# Patient Record
Sex: MALE | Race: WHITE | Employment: UNEMPLOYED | URBAN - METROPOLITAN AREA
[De-identification: names, ages, dates, MRNs, and addresses within clinical notes are randomized per-mention and may not be internally consistent; named-entity substitution may affect disease eponyms.]

---

## 2020-07-02 ENCOUNTER — OFFICE VISIT (OUTPATIENT)
Dept: FAMILY MEDICINE | Facility: CLINIC | Age: 50
End: 2020-07-02

## 2020-07-02 VITALS
HEART RATE: 54 BPM | OXYGEN SATURATION: 96 % | HEIGHT: 69 IN | WEIGHT: 236.2 LBS | TEMPERATURE: 98 F | BODY MASS INDEX: 34.98 KG/M2 | DIASTOLIC BLOOD PRESSURE: 88 MMHG | SYSTOLIC BLOOD PRESSURE: 130 MMHG

## 2020-07-02 DIAGNOSIS — M25.512 CHRONIC LEFT SHOULDER PAIN: ICD-10-CM

## 2020-07-02 DIAGNOSIS — Z76.89 HEALTH CARE HOME: ICD-10-CM

## 2020-07-02 DIAGNOSIS — G89.29 CHRONIC LEFT SHOULDER PAIN: ICD-10-CM

## 2020-07-02 DIAGNOSIS — F41.1 GENERALIZED ANXIETY DISORDER: ICD-10-CM

## 2020-07-02 DIAGNOSIS — Z01.818 PRE-OPERATIVE EXAMINATION: Primary | ICD-10-CM

## 2020-07-02 PROBLEM — Z13.6 SCREENING FOR CARDIOVASCULAR CONDITION: Status: ACTIVE | Noted: 2020-07-02

## 2020-07-02 PROBLEM — Z71.89 ACP (ADVANCE CARE PLANNING): Status: ACTIVE | Noted: 2020-07-02

## 2020-07-02 PROBLEM — Z98.1 HISTORY OF LUMBAR FUSION: Status: ACTIVE | Noted: 2020-07-02

## 2020-07-02 PROBLEM — Z87.891 HISTORY OF SMOKELESS TOBACCO USE: Status: ACTIVE | Noted: 2020-07-02

## 2020-07-02 PROBLEM — M65.839 INTERSECTION SYNDROME: Status: ACTIVE | Noted: 2017-03-10

## 2020-07-02 LAB
ERYTHROCYTE [DISTWIDTH] IN BLOOD BY AUTOMATED COUNT: 12.7 %
HCT VFR BLD AUTO: 43.1 % (ref 40–53)
HEMOGLOBIN: 14.9 G/DL (ref 13.3–17.7)
MCH RBC QN AUTO: 31 PG (ref 26–33)
MCHC RBC AUTO-ENTMCNC: 34.6 G/DL (ref 31–36)
MCV RBC AUTO: 89.6 FL (ref 78–100)
PLATELET COUNT - QUEST: 135 10^9/L (ref 150–375)
RBC # BLD AUTO: 4.81 10*12/L (ref 4.4–5.9)
WBC # BLD AUTO: 5.6 10*9/L (ref 4–11)

## 2020-07-02 PROCEDURE — 36415 COLL VENOUS BLD VENIPUNCTURE: CPT | Performed by: PHYSICIAN ASSISTANT

## 2020-07-02 PROCEDURE — 85027 COMPLETE CBC AUTOMATED: CPT | Performed by: PHYSICIAN ASSISTANT

## 2020-07-02 PROCEDURE — 99214 OFFICE O/P EST MOD 30 MIN: CPT | Performed by: PHYSICIAN ASSISTANT

## 2020-07-02 RX ORDER — PAROXETINE 30 MG/1
TABLET, FILM COATED ORAL
COMMUNITY
Start: 2020-07-01 | End: 2020-10-30

## 2020-07-02 SDOH — HEALTH STABILITY: MENTAL HEALTH: HOW OFTEN DO YOU HAVE A DRINK CONTAINING ALCOHOL?: 2-3 TIMES A WEEK

## 2020-07-02 SDOH — HEALTH STABILITY: MENTAL HEALTH: HOW MANY STANDARD DRINKS CONTAINING ALCOHOL DO YOU HAVE ON A TYPICAL DAY?: 3 OR 4

## 2020-07-02 SDOH — HEALTH STABILITY: MENTAL HEALTH: HOW OFTEN DO YOU HAVE 6 OR MORE DRINKS ON ONE OCCASION?: NEVER

## 2020-07-02 ASSESSMENT — MIFFLIN-ST. JEOR: SCORE: 1903.84

## 2020-07-02 NOTE — PROGRESS NOTES
[facility] PHYSICIANS  [address] [place] [address]  [address]  [facility] [address]  [phone]  Dept: [phone]    PRE-OP EVALUATION:  Today's date: 2020    Kelby Hong (: 1970) presents for pre-operative evaluation assessment as requested by Dr. Mendiola.  He requires evaluation and anesthesia risk assessment prior to undergoing surgery/procedure for treatment of Left shoulder.    Proposed Surgery/ Procedure: Left shoulder scope  Date of Surgery/ Procedure: 2020  Time of Surgery/ Procedure: 1045 am  Hospital/Surgical Facility: [facility]  Fax number for surgical facility: [phone]  Primary Physician: Belen Martin  Type of Anesthesia Anticipated: to be determined    Patient has a Health Care Directive or Living Will:  NO    1. NO - Do you have a history of heart attack, stroke, stent, bypass or surgery on an artery in the head, neck, heart or legs?  2. NO - Do you ever have any pain or discomfort in your chest?  3. NO - Do you have a history of  Heart Failure?  4. NO - Are you troubled by shortness of breath when: walking on the level, up a slight hill or at night?  5. NO - Do you currently have a cold, bronchitis or other respiratory infection?  6. NO - Do you have a cough, shortness of breath or wheezing?  7. NO - Do you sometimes get pains in the calves of your legs when you walk?  8. NO - Do you or anyone in your family have previous history of blood clots?  9. NO - Do you or does anyone in your family have a serious bleeding problem such as prolonged bleeding following surgeries or cuts?  10. NO - Have you ever had problems with anemia or been told to take iron pills?  11. NO - Have you had any abnormal blood loss such as black, tarry or bloody stools, or abnormal vaginal bleeding?  12. NO - Have you ever had a blood transfusion?  13. NO - Have you or any of your relatives ever had problems with anesthesia?  14. NO - Do you have sleep apnea, excessive  snoring or daytime drowsiness?  15. NO - Do you have any prosthetic heart valves?  16. NO - Do you have prosthetic joints?  17. NO - Is there any chance that you may be pregnant?      HPI:     HPI related to upcoming procedure: Kelby sustained a injury to left shoulder approximately 1 year ago. Did have MRI last October, and tried therapy and corticosteroid injection, which wasn't adequately effective. Plan now is for arthroscopic surgery.     See problem list for active medical problems.  Problems all longstanding and stable, except as noted/documented.  See ROS for pertinent symptoms related to these conditions.      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 No pertinent past medical history.  Past Surgical History:   Procedure Laterality Date     AS LAT LUMBAR SPINE FUSION  2005    L4-S1     ROTATOR CUFF REPAIR RT/LT Right     2000     VASECTOMY      age 23     Current Outpatient Medications   Medication Sig Dispense Refill     PARoxetine (PAXIL) 30 MG tablet        OTC products: None, except as noted above    Allergies   Allergen Reactions     Diphtheria Toxoid-Tetanus Tox-Thimerosal      unknown     Tetanus Toxoid      PN: LW Reaction: Unknown Reaction      Latex Allergy: NO    Social History     Tobacco Use     Smoking status: Never Smoker     Smokeless tobacco: Former User   Substance Use Topics     Alcohol use: Yes     Frequency: 2-3 times a week     Drinks per session: 3 or 4     " Binge frequency: Never     History   Drug Use Unknown       REVIEW OF SYSTEMS:   Constitutional, neuro, ENT, endocrine, pulmonary, cardiac, gastrointestinal, genitourinary, musculoskeletal, integument and psychiatric systems are negative, except as otherwise noted.    EXAM:   /88 (BP Location: Right arm, Patient Position: Sitting, Cuff Size: Adult Large)   Pulse 54   Temp 98  F (36.7  C) (Oral)   Ht 1.74 m (5' 8.5\")   Wt 107.1 kg (236 lb 3.2 oz)   SpO2 96%   BMI 35.39 kg/m      GENERAL APPEARANCE: healthy, alert and no distress     EYES: EOMI,  PERRL     HENT: ear canals and TM's normal and nose and mouth without ulcers or lesions     NECK: no adenopathy, no asymmetry, masses, or scars and thyroid normal to palpation     RESP: lungs clear to auscultation - no rales, rhonchi or wheezes     CV: regular rates and rhythm, normal S1 S2, no S3 or S4 and no murmur, click or rub     ABDOMEN:  soft, nontender, no HSM or masses and bowel sounds normal     MS: extremities normal- no gross deformities noted, no evidence of inflammation in joints, FROM in all extremities.     SKIN: no suspicious lesions or rashes     NEURO: Normal strength and tone, sensory exam grossly normal, mentation intact and speech normal     PSYCH: mentation appears normal. and affect normal/bright     LYMPHATICS: No cervical adenopathy    DIAGNOSTICS:   EKG: Not indicated due to non-vascular surgery and low risk of event (age <65 and without cardiac risk factors)  Hemoglobin (indicated for history of anemia or procedure with significant blood loss such as tonsillectomy, major intraperitoneal surgery, vascular surgery, major spine surgery, total joint replacement)    Office Visit on 07/02/2020   Component Date Value Ref Range Status     WBC 07/02/2020 5.6  4.0 - 11 10*9/L Final     RBC Count 07/02/2020 4.81  4.4 - 5.9 10*12/L Final     Hemoglobin 07/02/2020 14.9  13.3 - 17.7 g/dL Final     Hematocrit 07/02/2020 43.1  40.0 - 53.0 % Final     " MCV 07/02/2020 89.6  78 - 100 fL Final     MCH 07/02/2020 31.0  26 - 33 pg Final     MCHC 07/02/2020 34.6  31 - 36 g/dL Final     RDW 07/02/2020 12.7  % Final     Platelet Count 07/02/2020 135* 150 - 375 10^9/L Final    fingerpoke, ran twice     Platelets mildly low. No immediate concerns.    IMPRESSION:   Reason for surgery/procedure: Left shoulder pain  Diagnosis/reason for consult: Pre-operative Examination    The proposed surgical procedure is considered INTERMEDIATE risk.    REVISED CARDIAC RISK INDEX  The patient has the following serious cardiovascular risks for perioperative complications such as (MI, PE, VFib and 3  AV Block):  No serious cardiac risks  INTERPRETATION: 1 risks: Class II (low risk - 0.9% complication rate)    The patient has the following additional risks for perioperative complications:  No identified additional risks      ICD-10-CM    1. Pre-operative examination  Z01.818 HEMOGRAM/PLATELET (BFP)     VENOUS COLLECTION   2. Chronic left shoulder pain  M25.512 HEMOGRAM/PLATELET (BFP)    G89.29 VENOUS COLLECTION   3. Generalized anxiety disorder  F41.1    4. Health Care Home  Z76.89        RECOMMENDATIONS:     --Patient is to take all scheduled medications on the day of surgery EXCEPT for modifications listed below.    APPROVAL GIVEN to proceed with proposed procedure, without further diagnostic evaluation    1. Seven days before surgery do not take Aspirin or any over-the-counter pain medications other than Tylenol.  TYLENOL is the safest pain pill to use before surgery because it does not affect your bleeding time. If tylenol is not sufficient for pain control talk to me or the surgeon and we will decide what is safe to use.    2. Do not eat anything after midnight  (sudarshan of the surgery) and nothing the morning of the surgery.    3. Medications: Hold medication on day of surgery.      4. Follow all instructions given by the surgery team. They usually give out a packet. Read it and please  follow it precisely. This helps surgical experience and outcomes.    5. If you have any questions do not hesitate to call me or the surgeon/surgical team.      Belen Martin PA-C  [facility]           Signed Electronically by: Belen Martin PA-C    Copy of this evaluation report is provided to requesting physician.    [place] Preop Guidelines    Revised Cardiac Risk Index

## 2020-07-02 NOTE — LETTER
[facility] PHYSICIANS  [address] [place] [address]  [address]  [facility] [address]  [phone]  Dept: [phone]    PRE-OP EVALUATION:  Today's date: 2020    Kelby Hong (: 1970) presents for pre-operative evaluation assessment as requested by Dr. Mendiola.  He requires evaluation and anesthesia risk assessment prior to undergoing surgery/procedure for treatment of Left shoulder.    Proposed Surgery/ Procedure: Left shoulder scope  Date of Surgery/ Procedure: 2020  Time of Surgery/ Procedure: 1045 am  Hospital/Surgical Facility: [facility]  Fax number for surgical facility: [phone]  Primary Physician: Belen Martin  Type of Anesthesia Anticipated: to be determined    Patient has a Health Care Directive or Living Will:  NO    1. NO - Do you have a history of heart attack, stroke, stent, bypass or surgery on an artery in the head, neck, heart or legs?  2. NO - Do you ever have any pain or discomfort in your chest?  3. NO - Do you have a history of  Heart Failure?  4. NO - Are you troubled by shortness of breath when: walking on the level, up a slight hill or at night?  5. NO - Do you currently have a cold, bronchitis or other respiratory infection?  6. NO - Do you have a cough, shortness of breath or wheezing?  7. NO - Do you sometimes get pains in the calves of your legs when you walk?  8. NO - Do you or anyone in your family have previous history of blood clots?  9. NO - Do you or does anyone in your family have a serious bleeding problem such as prolonged bleeding following surgeries or cuts?  10. NO - Have you ever had problems with anemia or been told to take iron pills?  11. NO - Have you had any abnormal blood loss such as black, tarry or bloody stools, or abnormal vaginal bleeding?  12. NO - Have you ever had a blood transfusion?  13. NO - Have you or any of your relatives ever had problems with anesthesia?  14. NO - Do you have sleep apnea, excessive  snoring or daytime drowsiness?  15. NO - Do you have any prosthetic heart valves?  16. NO - Do you have prosthetic joints?  17. NO - Is there any chance that you may be pregnant?      HPI:     HPI related to upcoming procedure: Kelby sustained a injury to left shoulder approximately 1 year ago. Did have MRI last October, and tried therapy and corticosteroid injection, which wasn't adequately effective. Plan now is for arthroscopic surgery.     See problem list for active medical problems.  Problems all longstanding and stable, except as noted/documented.  See ROS for pertinent symptoms related to these conditions.      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 No pertinent past medical history.  Past Surgical History:   Procedure Laterality Date     AS LAT LUMBAR SPINE FUSION  2005    L4-S1     ROTATOR CUFF REPAIR RT/LT Right     2000     VASECTOMY      age 23     Current Outpatient Medications   Medication Sig Dispense Refill     PARoxetine (PAXIL) 30 MG tablet        OTC products: None, except as noted above    Allergies   Allergen Reactions     Diphtheria Toxoid-Tetanus Tox-Thimerosal      unknown     Tetanus Toxoid      PN: LW Reaction: Unknown Reaction      Latex Allergy: NO    Social History     Tobacco Use     Smoking status: Never Smoker     Smokeless tobacco: Former User   Substance Use Topics     Alcohol use: Yes     Frequency: 2-3 times a week     Drinks per session: 3 or 4     " Binge frequency: Never     History   Drug Use Unknown       REVIEW OF SYSTEMS:   Constitutional, neuro, ENT, endocrine, pulmonary, cardiac, gastrointestinal, genitourinary, musculoskeletal, integument and psychiatric systems are negative, except as otherwise noted.    EXAM:   /88 (BP Location: Right arm, Patient Position: Sitting, Cuff Size: Adult Large)   Pulse 54   Temp 98  F (36.7  C) (Oral)   Ht 1.74 m (5' 8.5\")   Wt 107.1 kg (236 lb 3.2 oz)   SpO2 96%   BMI 35.39 kg/m      GENERAL APPEARANCE: healthy, alert and no distress     EYES: EOMI,  PERRL     HENT: ear canals and TM's normal and nose and mouth without ulcers or lesions     NECK: no adenopathy, no asymmetry, masses, or scars and thyroid normal to palpation     RESP: lungs clear to auscultation - no rales, rhonchi or wheezes     CV: regular rates and rhythm, normal S1 S2, no S3 or S4 and no murmur, click or rub     ABDOMEN:  soft, nontender, no HSM or masses and bowel sounds normal     MS: extremities normal- no gross deformities noted, no evidence of inflammation in joints, FROM in all extremities.     SKIN: no suspicious lesions or rashes     NEURO: Normal strength and tone, sensory exam grossly normal, mentation intact and speech normal     PSYCH: mentation appears normal. and affect normal/bright     LYMPHATICS: No cervical adenopathy    DIAGNOSTICS:   EKG: Not indicated due to non-vascular surgery and low risk of event (age <65 and without cardiac risk factors)  Hemoglobin (indicated for history of anemia or procedure with significant blood loss such as tonsillectomy, major intraperitoneal surgery, vascular surgery, major spine surgery, total joint replacement)    Office Visit on 07/02/2020   Component Date Value Ref Range Status     WBC 07/02/2020 5.6  4.0 - 11 10*9/L Final     RBC Count 07/02/2020 4.81  4.4 - 5.9 10*12/L Final     Hemoglobin 07/02/2020 14.9  13.3 - 17.7 g/dL Final     Hematocrit 07/02/2020 43.1  40.0 - 53.0 % Final     " MCV 07/02/2020 89.6  78 - 100 fL Final     MCH 07/02/2020 31.0  26 - 33 pg Final     MCHC 07/02/2020 34.6  31 - 36 g/dL Final     RDW 07/02/2020 12.7  % Final     Platelet Count 07/02/2020 135* 150 - 375 10^9/L Final    fingerpoke, ran twice     Platelets mildly low. No immediate concerns.    IMPRESSION:   Reason for surgery/procedure: Left shoulder pain  Diagnosis/reason for consult: Pre-operative Examination    The proposed surgical procedure is considered INTERMEDIATE risk.    REVISED CARDIAC RISK INDEX  The patient has the following serious cardiovascular risks for perioperative complications such as (MI, PE, VFib and 3  AV Block):  No serious cardiac risks  INTERPRETATION: 1 risks: Class II (low risk - 0.9% complication rate)    The patient has the following additional risks for perioperative complications:  No identified additional risks      ICD-10-CM    1. Pre-operative examination  Z01.818 HEMOGRAM/PLATELET (BFP)     VENOUS COLLECTION   2. Chronic left shoulder pain  M25.512 HEMOGRAM/PLATELET (BFP)    G89.29 VENOUS COLLECTION   3. Generalized anxiety disorder  F41.1    4. Health Care Home  Z76.89        RECOMMENDATIONS:     --Patient is to take all scheduled medications on the day of surgery EXCEPT for modifications listed below.    APPROVAL GIVEN to proceed with proposed procedure, without further diagnostic evaluation    1. Seven days before surgery do not take Aspirin or any over-the-counter pain medications other than Tylenol.  TYLENOL is the safest pain pill to use before surgery because it does not affect your bleeding time. If tylenol is not sufficient for pain control talk to me or the surgeon and we will decide what is safe to use.    2. Do not eat anything after midnight  (sudarshan of the surgery) and nothing the morning of the surgery.    3. Medications: Hold medication on day of surgery.      4. Follow all instructions given by the surgery team. They usually give out a packet. Read it and please  follow it precisely. This helps surgical experience and outcomes.    5. If you have any questions do not hesitate to call me or the surgeon/surgical team.      Belen Martin PA-C  [facility]           Signed Electronically by: Belen Martin PA-C    Copy of this evaluation report is provided to requesting physician.    [facility] Preop Guidelines    Revised Cardiac Risk Index

## 2020-09-30 RX ORDER — PAROXETINE 30 MG/1
30 TABLET, FILM COATED ORAL EVERY MORNING
COMMUNITY
Start: 2020-09-30

## 2020-09-30 NOTE — TELEPHONE ENCOUNTER
Refused Prescriptions:                       Disp   Refills    PARoxetine (PAXIL) 30 MG tablet                            Sig: Take 1 tablet (30 mg) by mouth every morning  Refused By: MARA FISHER  Reason for Refusal: Patient needs appointment    This patient was in to see [facility] in July for a pre-op.  He has not seen Dr Hernandez for a medication check.  Needs to be seen to discuss meds.

## 2020-10-30 ENCOUNTER — OFFICE VISIT (OUTPATIENT)
Dept: FAMILY MEDICINE | Facility: CLINIC | Age: 50
End: 2020-10-30

## 2020-10-30 VITALS
HEIGHT: 68 IN | OXYGEN SATURATION: 98 % | BODY MASS INDEX: 36.28 KG/M2 | WEIGHT: 239.4 LBS | TEMPERATURE: 98 F | HEART RATE: 58 BPM | DIASTOLIC BLOOD PRESSURE: 80 MMHG | SYSTOLIC BLOOD PRESSURE: 120 MMHG

## 2020-10-30 DIAGNOSIS — Z00.00 ROUTINE HISTORY AND PHYSICAL EXAMINATION OF ADULT: Primary | ICD-10-CM

## 2020-10-30 DIAGNOSIS — F34.1 DYSTHYMIC DISORDER: ICD-10-CM

## 2020-10-30 LAB
ALBUMIN SERPL-MCNC: 4.5 G/DL (ref 3.6–5.1)
ALBUMIN/GLOB SERPL: 1.7 {RATIO} (ref 1–2.5)
ALP SERPL-CCNC: 74 U/L (ref 33–130)
ALT 1742-6: 36 U/L (ref 0–32)
AST 1920-8: 25 U/L (ref 0–35)
BILIRUB SERPL-MCNC: 0.6 MG/DL (ref 0.2–1.2)
BUN SERPL-MCNC: 25 MG/DL (ref 7–25)
BUN/CREATININE RATIO: 23.8 (ref 6–22)
CALCIUM SERPL-MCNC: 9.9 MG/DL (ref 8.6–10.3)
CHLORIDE SERPLBLD-SCNC: 107.1 MMOL/L (ref 98–110)
CHOLEST SERPL-MCNC: 225 MG/DL (ref 0–199)
CHOLEST/HDLC SERPL: 5 {RATIO} (ref 0–5)
CO2 SERPL-SCNC: 28.9 MMOL/L (ref 20–32)
CREAT SERPL-MCNC: 1.05 MG/DL (ref 0.7–1.18)
GLOBULIN, CALCULATED - QUEST: 2.6 (ref 1.9–3.7)
GLUCOSE SERPL-MCNC: 103 MG/DL (ref 60–99)
HDLC SERPL-MCNC: 50 MG/DL (ref 40–150)
HEMOGLOBIN: 16.1 G/DL (ref 13.3–17.7)
LDLC SERPL CALC-MCNC: 149 MG/DL (ref 0–130)
POTASSIUM SERPL-SCNC: 5.17 MMOL/L (ref 3.5–5.3)
PROT SERPL-MCNC: 7.1 G/DL (ref 6.1–8.1)
SODIUM SERPL-SCNC: 143.3 MMOL/L (ref 135–146)
TRIGL SERPL-MCNC: 130 MG/DL (ref 0–149)

## 2020-10-30 PROCEDURE — 36415 COLL VENOUS BLD VENIPUNCTURE: CPT | Performed by: FAMILY MEDICINE

## 2020-10-30 PROCEDURE — 99396 PREV VISIT EST AGE 40-64: CPT | Performed by: FAMILY MEDICINE

## 2020-10-30 PROCEDURE — 80053 COMPREHEN METABOLIC PANEL: CPT | Performed by: FAMILY MEDICINE

## 2020-10-30 PROCEDURE — 85018 HEMOGLOBIN: CPT | Performed by: FAMILY MEDICINE

## 2020-10-30 PROCEDURE — 84153 ASSAY OF PSA TOTAL: CPT | Mod: 90 | Performed by: FAMILY MEDICINE

## 2020-10-30 PROCEDURE — 80061 LIPID PANEL: CPT | Performed by: FAMILY MEDICINE

## 2020-10-30 RX ORDER — PAROXETINE 30 MG/1
30 TABLET, FILM COATED ORAL EVERY MORNING
Qty: 90 TABLET | Refills: 3 | Status: SHIPPED | OUTPATIENT
Start: 2020-10-30

## 2020-10-30 ASSESSMENT — ANXIETY QUESTIONNAIRES
2. NOT BEING ABLE TO STOP OR CONTROL WORRYING: NOT AT ALL
7. FEELING AFRAID AS IF SOMETHING AWFUL MIGHT HAPPEN: SEVERAL DAYS
6. BECOMING EASILY ANNOYED OR IRRITABLE: SEVERAL DAYS
1. FEELING NERVOUS, ANXIOUS, OR ON EDGE: NOT AT ALL
5. BEING SO RESTLESS THAT IT IS HARD TO SIT STILL: NOT AT ALL
GAD7 TOTAL SCORE: 2
IF YOU CHECKED OFF ANY PROBLEMS ON THIS QUESTIONNAIRE, HOW DIFFICULT HAVE THESE PROBLEMS MADE IT FOR YOU TO DO YOUR WORK, TAKE CARE OF THINGS AT HOME, OR GET ALONG WITH OTHER PEOPLE: NOT DIFFICULT AT ALL
3. WORRYING TOO MUCH ABOUT DIFFERENT THINGS: NOT AT ALL

## 2020-10-30 ASSESSMENT — PATIENT HEALTH QUESTIONNAIRE - PHQ9
5. POOR APPETITE OR OVEREATING: NOT AT ALL
SUM OF ALL RESPONSES TO PHQ QUESTIONS 1-9: 4

## 2020-10-30 ASSESSMENT — MIFFLIN-ST. JEOR: SCORE: 1910.41

## 2020-10-30 NOTE — PROGRESS NOTES
SUBJECTIVE:    CC: Kelby Hong is a 50 year old male who presents for physical    HPI: over all healthy    HISTORIES:    PROBLEM LIST:                   Patient Active Problem List   Diagnosis     Dysthymic disorder     History of lumbar fusion     History of smokeless tobacco use     Intersection syndrome     ACP (advance care planning)     Health Care Home       PAST MEDICAL HISTORY:                History reviewed. No pertinent past medical history.    PAST SURGICAL HISTORY:                  Past Surgical History:   Procedure Laterality Date     AS LAT LUMBAR SPINE FUSION  2005    L4-S1     ROTATOR CUFF REPAIR RT/LT Right     2000     VASECTOMY      age 23       CURRENT MEDICATIONS:                  Current Outpatient Medications   Medication Sig Dispense Refill     PARoxetine (PAXIL) 30 MG tablet Take 1 tablet (30 mg) by mouth every morning 90 tablet 3       ALLERGIES:                  Allergies   Allergen Reactions     Diphtheria Toxoid-Tetanus Tox-Thimerosal      unknown     Tetanus Toxoid      PN: LW Reaction: Unknown Reaction       SOCIAL HISTORY:                  Social History     Socioeconomic History     Marital status:      Spouse name: Not on file     Number of children: Not on file     Years of education: Not on file     Highest education level: Not on file   Occupational History     Not on file   Social Needs     Financial resource strain: Not on file     Food insecurity     Worry: Not on file     Inability: Not on file     Transportation needs     Medical: Not on file     Non-medical: Not on file   Tobacco Use     Smoking status: Never Smoker     Smokeless tobacco: Former User   Substance and Sexual Activity     Alcohol use: Yes     Frequency: 2-3 times a week     Drinks per session: 3 or 4     Binge frequency: Never     Drug use: Never     Sexual activity: Yes     Partners: Female     Birth control/protection: Male Surgical   Lifestyle     Physical activity     Days per week: Not on file  "    Minutes per session: Not on file     Stress: Not on file   Relationships     Social connections     Talks on phone: Not on file     Gets together: Not on file     Attends Jain service: Not on file     Active member of club or organization: Not on file     Attends meetings of clubs or organizations: Not on file     Relationship status: Not on file     Intimate partner violence     Fear of current or ex partner: Not on file     Emotionally abused: Not on file     Physically abused: Not on file     Forced sexual activity: Not on file   Other Topics Concern     Not on file   Social History Narrative     Not on file       FAMILY HISTORY:                   Family History   Problem Relation Age of Onset     Lung Cancer Mother      Lung Cancer Father      Hypertension Father      Colon Cancer No family hx of      Diabetes No family hx of      Prostate Cancer No family hx of      Hyperlipidemia No family hx of        HEALTH MAINTENANCE:                    REVIEW OF OUTSIDE RECORDS: YES - Date: 2019    REVIEW OF SYSTEMS:  CONSTITUTIONAL: NEGATIVE for fever, chills  INTEGUMENTARY/SKIN: NEGATIVE for worrisome rashes, moles or lesions  EYES: NEGATIVE for vision changes   ENT/MOUTH: NEGATIVE for ear, mouth and throat problems  RESP: NEGATIVE for significant cough or SOB  CV: NEGATIVE for chest pain, palpitations   GI: NEGATIVE for nausea, abdominal pain, heartburn, or change in bowel habits  : NEGATIVE for frequency, dysuria, or hematuria  MUSCULOSKELETAL: NEGATIVE for significant arthralgias or myalgia  NEURO: NEGATIVE for weakness, dizziness or paresthesias or headache  ENDOCRINE: NEGATIVE for temperature intolerance, skin/hair changes  HEME: NEGATIVE for bleeding problems  PSYCHIATRIC: NEGATIVE for changes in mood or affect    EXAM:    /80 (BP Location: Right arm, Patient Position: Sitting, Cuff Size: Adult Large)   Pulse 58   Temp 98  F (36.7  C) (Oral)   Ht 1.727 m (5' 8\")   Wt 108.6 kg (239 lb 6.4 oz)  "  SpO2 98%   BMI 36.40 kg/m    GENERAL APPEARANCE: healthy, alert and no distress   EXAM:  GENERAL APPEARANCE: healthy, alert and no distress  EYES: EOMI,  PERRL  HENT: ear canals and TM's normal and nose and mouth without ulcers or lesions  RESP: lungs clear to auscultation - no rales, rhonchi or wheezes  CV: regular rates and rhythm, normal S1 S2, no S3 or S4 and no murmur, click or rub -  ABDOMEN:  soft, nontender, no HSM or masses and bowel sounds normal  Rectal exam: prostate symmetric w/o nodularity, no masses palpated  GU_male: testicles normal without atrophy or masses and no hernias  MS: extremities normal- no gross deformities noted, no evidence of inflammation in joints, FROM in all extremities.  SKIN: no suspicious lesions or rashes  NEURO: Normal strength and tone, sensory exam grossly normal, mentation intact and speech normal  PSYCH: mentation appears normal and affect normal/bright  LYMPHATICS: No axillary, cervical, inguinal, or supraclavicular nodes         ASSESSMENT/PLAN  (Z00.00) Routine history and physical examination of adult  (primary encounter diagnosis)  Comment: healthy  Plan: HEMOGLOBIN, Comprehensive Metobolic Panel         (BFP), PSA Total ([facility]), Lipid Panel (BFP),         VENOUS COLLECTION     UTD on colon       (F34.1) Dysthymic disorder  Comment: doing well  Plan: PARoxetine (PAXIL) 30 MG tablet                I have discussed with patient the risks, benefits, medications, treatment options and modalities.   I have instructed the patient to call or schedule a follow-up appointment if any problems or failure to improve.

## 2020-10-30 NOTE — NURSING NOTE
Kelby is here for a fasting CPX.    Pre-Visit Screening:  Immunizations:UTD  Colonoscopy:2018  Mammogram:NA  Asthma Action Test/Plan:NA  PHQ9:today  GAD7:today  Questioned patient about current smoking habits Pt.never smoked  OK to leave a detailed message on voice mail for today's visit yes, phone # [phone]

## 2020-10-31 LAB — ABBOTT PSA - QUEST: 0.9 NG/ML

## 2020-10-31 ASSESSMENT — ANXIETY QUESTIONNAIRES: GAD7 TOTAL SCORE: 2

## 2020-11-02 ENCOUNTER — TELEPHONE (OUTPATIENT)
Dept: FAMILY MEDICINE | Facility: CLINIC | Age: 50
End: 2020-11-02

## 2020-11-02 NOTE — TELEPHONE ENCOUNTER
----- Message from Zach Hernandez MD sent at 11/2/2020  7:53 AM CST -----  Please call over all blood work fine except for mild elevated glucose and moderated elevated total chol but ratios doing pretty well  Recommend work on exercise and watch diet Recheck 6 months Dr CLEVELAND

## 2022-04-24 ENCOUNTER — OFFICE VISIT (OUTPATIENT)
Dept: URGENT CARE | Facility: URGENT CARE | Age: 52
End: 2022-04-24
Payer: COMMERCIAL

## 2022-04-24 VITALS
SYSTOLIC BLOOD PRESSURE: 126 MMHG | BODY MASS INDEX: 35.73 KG/M2 | DIASTOLIC BLOOD PRESSURE: 82 MMHG | OXYGEN SATURATION: 97 % | TEMPERATURE: 98.7 F | WEIGHT: 235 LBS | HEART RATE: 77 BPM | RESPIRATION RATE: 16 BRPM

## 2022-04-24 DIAGNOSIS — J20.6 ACUTE BRONCHITIS DUE TO RHINOVIRUS: Primary | ICD-10-CM

## 2022-04-24 PROCEDURE — 99213 OFFICE O/P EST LOW 20 MIN: CPT | Performed by: PHYSICIAN ASSISTANT

## 2022-04-24 RX ORDER — CODEINE PHOSPHATE AND GUAIFENESIN 10; 100 MG/5ML; MG/5ML
1-2 SOLUTION ORAL EVERY 4 HOURS PRN
Qty: 118 ML | Refills: 0 | Status: SHIPPED | OUTPATIENT
Start: 2022-04-24

## 2022-04-24 RX ORDER — ALBUTEROL SULFATE 90 UG/1
2 AEROSOL, METERED RESPIRATORY (INHALATION) EVERY 6 HOURS
Qty: 18 G | Refills: 0 | Status: SHIPPED | OUTPATIENT
Start: 2022-04-24

## 2022-04-24 ASSESSMENT — ENCOUNTER SYMPTOMS
ABDOMINAL PAIN: 0
COUGH: 1
FEVER: 0
WHEEZING: 1
VOMITING: 0
DIARRHEA: 0
SHORTNESS OF BREATH: 1
RHINORRHEA: 0

## 2022-04-24 NOTE — PROGRESS NOTES
Assessment & Plan:        ICD-10-CM    1. Acute bronchitis due to Rhinovirus  J20.6 albuterol (PROAIR HFA/PROVENTIL HFA/VENTOLIN HFA) 108 (90 Base) MCG/ACT inhaler     guaiFENesin-codeine (ROBITUSSIN AC) 100-10 MG/5ML solution         Plan/Clinical Decision Making:    Lungs clear on exam.  No fever.   Use albuterol inhaler as needed for wheezing or chest tightness.   Discussed how to use.   Can use Robitussin AC hs prn for cough at night.    checked, no concerns.     If worsening symptoms Follow-up for further evaluation, CXR.           At the end of the encounter, I discussed results, diagnosis, medications. Discussed red flags for immediate return to clinic/ER, as well as indications for follow up if no improvement. Patient understood and agreed to plan. Patient was stable for discharge.        Ivelisse Cortes PA-C on 4/24/2022 at 9:26 AM          Subjective:     HPI:    Kelby is a 52 year old male who presents to clinic today for the following health issues:  Chief Complaint   Patient presents with     Breathing Problem     SOB,  HA, chest congestion x 5 days -- took some Musinex-- NEG covid home test on Thursday     HPI    Patient complains of SOB, HA, chest congestion x 5 days.   [place]  Has had two at home covid tests.   Coughing seems to be worse symptoms.   Has tried Mucinex.   No hx of asthma.   Wife was recently ill with cough 3 weeks ago.   Up coughing at night and harder to sleep.     History obtained from the patient.    Review of Systems   Constitutional: Negative for fever.   HENT: Negative for congestion and rhinorrhea.    Respiratory: Positive for cough, shortness of breath and wheezing.    Gastrointestinal: Negative for abdominal pain, diarrhea and vomiting.         Patient Active Problem List   Diagnosis     Dysthymic disorder     History of lumbar fusion     History of smokeless tobacco use     Intersection syndrome     ACP (advance care planning)     Health Care Home         History reviewed. No pertinent past medical history.    Social History     Tobacco Use     Smoking status: Never Smoker     Smokeless tobacco: Former User   Substance Use Topics     Alcohol use: Yes             Objective:     Vitals:    04/24/22 0922   BP: 126/82   BP Location: Right arm   Patient Position: Chair   Cuff Size: Adult Large   Pulse: 77   Resp: 16   Temp: 98.7  F (37.1  C)   TempSrc: Oral   SpO2: 97%   Weight: 106.6 kg (235 lb)         Physical Exam   EXAM:   Pleasant, alert, appropriate appearance. NAD. Coughing throughout exam.   Head Exam: Normocephalic, atraumatic.  Eye Exam:  non icteric/injection.    Ear Exam: TMs grey without bulging. Normal canals.  Normal pinna.  Nose Exam: Normal external nose.    OroPharynx Exam:  Moist mucous membranes. No erythema, pharynx without exudate or hypertrophy.  Neck/Thyroid Exam:  No LAD.  No nodules or enlargement.  Chest/Respiratory Exam: CTAB.  Cardiovascular Exam: RRR. No murmur or rubs.      Results:  No results found for any visits on 04/24/22.

## 2022-04-26 ENCOUNTER — OFFICE VISIT (OUTPATIENT)
Dept: URGENT CARE | Facility: URGENT CARE | Age: 52
End: 2022-04-26
Payer: COMMERCIAL

## 2022-04-26 VITALS
SYSTOLIC BLOOD PRESSURE: 126 MMHG | DIASTOLIC BLOOD PRESSURE: 62 MMHG | OXYGEN SATURATION: 96 % | TEMPERATURE: 98.6 F | WEIGHT: 235 LBS | HEART RATE: 75 BPM | BODY MASS INDEX: 35.73 KG/M2 | RESPIRATION RATE: 16 BRPM

## 2022-04-26 DIAGNOSIS — J18.9 PNEUMONIA OF LEFT LOWER LOBE DUE TO INFECTIOUS ORGANISM: ICD-10-CM

## 2022-04-26 DIAGNOSIS — R06.02 SOB (SHORTNESS OF BREATH): Primary | ICD-10-CM

## 2022-04-26 PROCEDURE — 99214 OFFICE O/P EST MOD 30 MIN: CPT | Mod: 25 | Performed by: PHYSICIAN ASSISTANT

## 2022-04-26 PROCEDURE — 94640 AIRWAY INHALATION TREATMENT: CPT | Performed by: PHYSICIAN ASSISTANT

## 2022-04-26 RX ORDER — BENZONATATE 100 MG/1
200 CAPSULE ORAL 3 TIMES DAILY PRN
Qty: 40 CAPSULE | Refills: 0 | Status: SHIPPED | OUTPATIENT
Start: 2022-04-26

## 2022-04-26 RX ORDER — PREDNISONE 20 MG/1
40 TABLET ORAL DAILY
Qty: 10 TABLET | Refills: 0 | Status: SHIPPED | OUTPATIENT
Start: 2022-04-26 | End: 2022-05-01

## 2022-04-26 RX ORDER — IPRATROPIUM BROMIDE AND ALBUTEROL SULFATE 2.5; .5 MG/3ML; MG/3ML
3 SOLUTION RESPIRATORY (INHALATION) ONCE
Status: COMPLETED | OUTPATIENT
Start: 2022-04-26 | End: 2022-04-26

## 2022-04-26 RX ORDER — AZITHROMYCIN 250 MG/1
TABLET, FILM COATED ORAL
Qty: 6 TABLET | Refills: 0 | Status: SHIPPED | OUTPATIENT
Start: 2022-04-26 | End: 2022-05-01

## 2022-04-26 RX ADMIN — IPRATROPIUM BROMIDE AND ALBUTEROL SULFATE 3 ML: 2.5; .5 SOLUTION RESPIRATORY (INHALATION) at 19:58

## 2022-04-27 ASSESSMENT — ENCOUNTER SYMPTOMS
COUGH: 1
WHEEZING: 1
FATIGUE: 0
SHORTNESS OF BREATH: 1
RHINORRHEA: 1

## 2022-04-27 NOTE — PROGRESS NOTES
Assessment & Plan:        ICD-10-CM    1. SOB (shortness of breath)  R06.02 XR Chest 2 Views     INHALATION/NEBULIZER TREATMENT, INITIAL     ipratropium - albuterol 0.5 mg/2.5 mg/3 mL (DUONEB) neb solution 3 mL     azithromycin (ZITHROMAX) 250 MG tablet     predniSONE (DELTASONE) 20 MG tablet     benzonatate (TESSALON) 100 MG capsule     amoxicillin-clavulanate (AUGMENTIN) 875-125 MG tablet   2. Pneumonia of left lower lobe due to infectious organism  J18.9          Plan/Clinical Decision Making:    CXR with suspicious of pneumonia. Will treat with course of Augmentin and Zithromax.   Duo neb done in clinic.  Mild help. No wheezing heard on exam, but having tightness and occasional SOB.  Will treat with prednisone course.   Get rest, fluids. Tessalon Perles for cough prn.     IMPRESSION: Subtle patchy airspace opacity in the left lower lobe suspicious for pneumonia. Right lung clear. No pleural effusion or pneumothorax. Normal heart size.          At the end of the encounter, I discussed results, diagnosis, medications. Discussed red flags for immediate return to clinic/ER, as well as indications for follow up if no improvement. Patient understood and agreed to plan. Patient was stable for discharge.        Ivelisse Cortes PA-C on 4/26/2022 at 7:42 PM          Subjective:     HPI:    Kelby is a 52 year old male who presents to clinic today for the following health issues:  Chief Complaint   Patient presents with     Sick     Cough, body aches, SOB  x 7 days , was seen in  on Sunday - he got an  inhaler and some cough syrup  - FATHER has penumonia-- HOME covid test were negative     HPI    Cough, body aches. SOB x 7 days.   Patient seen on Sunday and prescribed inhaler and cough medication.   Hard to use inhaler since coughing so much.   Went to work yesterday, but stayed home.   Negative covid tests.   Nonsmoker.     History obtained from the patient.    Review of Systems   Constitutional: Negative for fatigue.    HENT: Positive for rhinorrhea.    Respiratory: Positive for cough, shortness of breath and wheezing.          Patient Active Problem List   Diagnosis     Dysthymic disorder     History of lumbar fusion     History of smokeless tobacco use     Intersection syndrome     ACP (advance care planning)     Health Care Home        History reviewed. No pertinent past medical history.    Social History     Tobacco Use     Smoking status: Never Smoker     Smokeless tobacco: Former User   Substance Use Topics     Alcohol use: Yes             Objective:     Vitals:    04/26/22 1917   BP: 126/62   BP Location: Right arm   Patient Position: Chair   Cuff Size: Adult Regular   Pulse: 75   Resp: 16   Temp: 98.6  F (37  C)   TempSrc: Oral   SpO2: 96%   Weight: 106.6 kg (235 lb)         Physical Exam   EXAM:   Pleasant, alert, appropriate appearance. Mild distress, coughing for much of exam.   Head Exam: Normocephalic, atraumatic.  Eye Exam:  non icteric/injection.    Ear Exam: TMs grey without bulging. Normal canals.  Normal pinna.  Nose Exam: Normal external nose.    OroPharynx Exam:  Moist mucous membranes. No erythema, pharynx without exudate or hypertrophy.  Neck/Thyroid Exam:  No LAD.    Chest/Respiratory Exam: CTAB.  Cardiovascular Exam: RRR. No murmur or rubs.      Results:  Results for orders placed or performed in visit on 04/26/22   XR Chest 2 Views     Status: None    Narrative    EXAM: XR CHEST 2 VW  LOCATION: [facility]  DATE/TIME: 4/26/2022 8:00 PM    INDICATION:  SOB (shortness of breath)  COMPARISON: 01/31/2019      Impression    IMPRESSION: Subtle patchy airspace opacity in the left lower lobe suspicious for pneumonia. Right lung clear. No pleural effusion or pneumothorax. Normal heart size.

## 2024-06-17 PROBLEM — Z76.89 HEALTH CARE HOME: Status: RESOLVED | Noted: 2020-07-02 | Resolved: 2024-06-17
